# Patient Record
Sex: FEMALE | Race: WHITE | NOT HISPANIC OR LATINO | Employment: FULL TIME | URBAN - METROPOLITAN AREA
[De-identification: names, ages, dates, MRNs, and addresses within clinical notes are randomized per-mention and may not be internally consistent; named-entity substitution may affect disease eponyms.]

---

## 2023-09-14 ENCOUNTER — TELEPHONE (OUTPATIENT)
Dept: PRIMARY CARE | Facility: CLINIC | Age: 38
End: 2023-09-14
Payer: COMMERCIAL

## 2023-09-14 NOTE — TELEPHONE ENCOUNTER
Pt called and states that she still have a lingering hard cough that lasted for 3 weeks. She believed she could had covid at the beginning(3weeks ago) she had a fever, diarrhea and chest congestion but she is doing a lot better from then. She have a hard cough and she sometimes cough up yellow/clear phlegm. She want to get rid of this cough. She want to know if you can prescribe something or can the appt be a virtual visit, please advise.

## 2023-09-17 PROBLEM — D72.819 LEUKOPENIA: Status: ACTIVE | Noted: 2023-09-17

## 2023-09-17 PROBLEM — K21.9 GERD WITHOUT ESOPHAGITIS: Status: ACTIVE | Noted: 2023-09-17

## 2023-09-18 RX ORDER — NORETHINDRONE ACETATE AND ETHINYL ESTRADIOL 1; 20 MG/1; UG/1
1 TABLET ORAL DAILY
COMMUNITY
Start: 2023-09-05

## 2023-09-18 RX ORDER — DROSPIRENONE AND ETHINYL ESTRADIOL 0.02-3(28)
1 KIT ORAL DAILY
COMMUNITY
Start: 2023-08-03 | End: 2023-09-19 | Stop reason: ALTCHOICE

## 2023-09-19 ENCOUNTER — TELEMEDICINE (OUTPATIENT)
Dept: PRIMARY CARE | Facility: CLINIC | Age: 38
End: 2023-09-19
Payer: COMMERCIAL

## 2023-09-19 DIAGNOSIS — J40 BRONCHITIS: ICD-10-CM

## 2023-09-19 DIAGNOSIS — J01.00 ACUTE NON-RECURRENT MAXILLARY SINUSITIS: Primary | ICD-10-CM

## 2023-09-19 PROCEDURE — 99213 OFFICE O/P EST LOW 20 MIN: CPT | Performed by: INTERNAL MEDICINE

## 2023-09-19 RX ORDER — PREDNISONE 10 MG/1
20 TABLET ORAL DAILY
Qty: 14 TABLET | Refills: 0 | Status: SHIPPED | OUTPATIENT
Start: 2023-09-19 | End: 2023-12-05 | Stop reason: WASHOUT

## 2023-09-19 RX ORDER — AZITHROMYCIN 250 MG/1
TABLET, FILM COATED ORAL
Qty: 6 TABLET | Refills: 0 | Status: SHIPPED | OUTPATIENT
Start: 2023-09-19 | End: 2023-09-24

## 2023-09-19 RX ORDER — CODEINE PHOSPHATE AND GUAIFENESIN 10; 100 MG/5ML; MG/5ML
5 SOLUTION ORAL EVERY 6 HOURS PRN
Qty: 240 ML | Refills: 0 | Status: SHIPPED | OUTPATIENT
Start: 2023-09-19 | End: 2023-10-03

## 2023-09-19 ASSESSMENT — ENCOUNTER SYMPTOMS
FEVER: 0
SWEATS: 1
WHEEZING: 0
WEIGHT LOSS: 0
COUGH: 1
RHINORRHEA: 1
HEMOPTYSIS: 0
SHORTNESS OF BREATH: 0
SORE THROAT: 0
HEADACHES: 1
MYALGIAS: 1
CHILLS: 0
HEARTBURN: 0

## 2023-09-19 NOTE — PROGRESS NOTES
PCP: Barby Seaman MD   I have reviewed existing histories, notes, past medical history, surgical history, social history, family history, med list, allergy list, and immunization, and updated.    HPI:   A few weeks ago, started a cough that felt like typical cold cough. Then persisted. Yellow mucus at times, headache and Post nasal drainage and feels like in upper chest also. No sob or wheezing. Feels like there is fever, but no fever when she checks it. Has been on tylenol on and off. Taking dayfile and Nyquil, Mucinex. Used nasal saline irrigation. On Flonase and antihistamine for her allergy year round    She checked her bp, pulse, pulse ox, all normal except for diastolic 90      Lab Results   Component Value Date    WBC 3.8 (L) 11/19/2019    HGB 14.6 11/19/2019    HCT 45.6 11/19/2019     11/19/2019    CHOL 193 11/19/2019    TRIG 70 11/19/2019    HDL 57.4 11/19/2019    ALT 15 11/19/2019    AST 16 11/19/2019     11/19/2019    K 4.0 11/19/2019     11/19/2019    CREATININE 0.58 11/19/2019    BUN 13 11/19/2019    CO2 29 11/19/2019    TSH 1.28 04/24/2021     Physical exam:  There were no vitals taken for this visit.   pt appears well.  No acute distress or resp distress  Patient is alert and orientated three.  mood and affection appropriate      Assessments/orders:   1. Acute non-recurrent maxillary sinusitis  azithromycin (Zithromax) 250 mg tablet, predniSONE (Deltasone) 10 mg tablet, codeine-guaifenesin (Robitussin-AC)  mg/5 mL syrup      2. Bronchitis  azithromycin (Zithromax) 250 mg tablet, predniSONE (Deltasone) 10 mg tablet, codeine-guaifenesin (Robitussin-AC)  mg/5 mL syrup                   Answers submitted by the patient for this visit:  Cough Questionnaire (Submitted on 9/19/2023)  Chief Complaint: Cough  Chronicity: new  Onset: 1 to 4 weeks ago  Progression since onset: waxing and waning  Frequency: hourly  Cough characteristics: productive of sputum  chest pain:  Yes  chills: No  ear congestion: Yes  ear pain: No  fever: No  headaches: Yes  heartburn: No  hemoptysis: No  myalgias: Yes  nasal congestion: Yes  postnasal drip: Yes  rash: No  rhinorrhea: Yes  shortness of breath: No  sore throat: No  sweats: Yes  weight loss: No  wheezing: No  Aggravated by: lying down

## 2023-12-05 ENCOUNTER — TELEMEDICINE (OUTPATIENT)
Dept: PRIMARY CARE | Facility: CLINIC | Age: 38
End: 2023-12-05
Payer: COMMERCIAL

## 2023-12-05 DIAGNOSIS — J01.11 ACUTE RECURRENT FRONTAL SINUSITIS: Primary | ICD-10-CM

## 2023-12-05 PROCEDURE — 99213 OFFICE O/P EST LOW 20 MIN: CPT | Performed by: FAMILY MEDICINE

## 2023-12-05 RX ORDER — AMOXICILLIN AND CLAVULANATE POTASSIUM 875; 125 MG/1; MG/1
875 TABLET, FILM COATED ORAL 2 TIMES DAILY
Qty: 20 TABLET | Refills: 0 | Status: SHIPPED | OUTPATIENT
Start: 2023-12-05 | End: 2023-12-15

## 2023-12-05 ASSESSMENT — ENCOUNTER SYMPTOMS
SINUS PAIN: 1
SINUS PRESSURE: 1
SHORTNESS OF BREATH: 0
COUGH: 1
RHINORRHEA: 1

## 2023-12-05 ASSESSMENT — LIFESTYLE VARIABLES: HISTORY_OF_SMOKING: I HAVE NEVER SMOKED

## 2023-12-05 NOTE — PROGRESS NOTES
Subjective   Patient ID: Rocio Hull is a 38 y.o. female who presents for No chief complaint on file..    Virtual or Telephone Consent    An interactive audio and video telecommunication system which permits real time communications between the patient (at the originating site) and provider (at the distant site) was utilized to provide this telehealth service.   Verbal consent was requested and obtained from Rocio Hull on this date, 12/05/23 for a telehealth visit.     Pt reports 12 day history of fever, nasal congestion  The fever has resolved  She has frontal sinus pressure, she has green nasal discharge  She has a cough, post nasal drainage, no SOB  She takes Zyrtec, Flonase, she added Mucinex  She had a recent sinus infection in September         Review of Systems   HENT:  Positive for congestion, rhinorrhea, sinus pressure and sinus pain.    Respiratory:  Positive for cough. Negative for shortness of breath.        Objective   There were no vitals taken for this visit.    Physical Exam  Constitutional:       Appearance: Normal appearance.   HENT:      Nose: Congestion present.   Pulmonary:      Effort: Pulmonary effort is normal.   Neurological:      Mental Status: She is alert.       Virtual Visit Duration: 10 min  [place]  Meds reviewed  Photo ID: verified      Assessment/Plan   Diagnoses and all orders for this visit:  Acute recurrent frontal sinusitis  -     amoxicillin-pot clavulanate (Augmentin) 875-125 mg tablet; Take 1 tablet (875 mg) by mouth 2 times a day for 10 days.  I counseled patient regarding the risks, benefits and side effects of antibiotics. Recc continued supportive care, fluids, rest, Mucinex  I advised the patient to have an in person exam with PCP, urgent care, or Emergency Room with any exacerbation of symptoms. The patient understands and agrees.     Eliana Chen, DO

## 2024-04-07 ENCOUNTER — PATIENT MESSAGE (OUTPATIENT)
Dept: PRIMARY CARE | Facility: CLINIC | Age: 39
End: 2024-04-07

## 2024-04-07 ENCOUNTER — TELEMEDICINE (OUTPATIENT)
Dept: PRIMARY CARE | Facility: CLINIC | Age: 39
End: 2024-04-07
Payer: COMMERCIAL

## 2024-04-07 DIAGNOSIS — L30.9 DERMATITIS: Primary | ICD-10-CM

## 2024-04-07 PROCEDURE — 99212 OFFICE O/P EST SF 10 MIN: CPT | Performed by: FAMILY MEDICINE

## 2024-04-07 RX ORDER — DESONIDE 0.5 MG/G
OINTMENT TOPICAL 2 TIMES DAILY PRN
Qty: 30 G | Refills: 0 | Status: SHIPPED | OUTPATIENT
Start: 2024-04-07 | End: 2025-04-07

## 2024-04-07 NOTE — PROGRESS NOTES
I performed this visit using realtime telehealth tools, including an audio/video OR telephone connection between the patient listed who was located in the [facility] and myself, Gayle Vines (Board certified in the [facility]).  At the start of the visit, I introduced myself as Dr. Rogers and verified the patients name, , and current physical location.    If they were currently outside of the state of [place], the visit was ended and the patient was referred to alternative means for evaluation and treatment.   The patient was made aware of the limitations of the telehealth visit.  They will not be physically examined and all issues may not be appropriate for a telehealth visit.  If necessary, an in person referral will be made.      All allergies were reviewed as well as reconciliation of all medications.      Rash-- on face--noted in Friday am behind ears and neck--slowly spread   On steroids for bronchitis in September--made her feel crazy-- prednisone--   Has had sensitive skin  Went to derm--told has a large histamine response to things--  Uses face creams and oils in the am and used in a different order than usual  Face and upper neck/chest and back of left hand--  Itches and sensitive --took benadry last night and this am--  Helped with itching a bit  Could tell when benadryl kicked in  Small little bumps--occas redness blotchy under bumps  Sl swollen under eyes and nose--   Have not been outside--  No new exposures to pets  No new laundry soaps  Had a rash from lotion LUSH and had a reaction--  As a teen slight acne and used benzoyl peroxide but left similar rash--   ?old products--   Zyrtec-- takes every day and flonase--  No flu like sxs  +nausea on Friday-- no V/D  Fatigue--+ ? From benadryl  Always has a bit of sneeze and cough-- 2 small kids-- and allergies--    ALL OTHER ROS (-)    General appearance:  Vitals available from patient?No    Alert, oriented, pleasant, in no apparent distress  Yes  Answers questions appropriatelyYes  Eyes clear?Yes    Throat exam Not Available    Is patient in respiratory distressNo  Is patient coughing during consult:No  Audible wheezing noted? :No    Pt sounds congested?: No  Sniffing or rhinorrhea?: No    Tender neck LAD by pt exam?:    Psychiatric: Affect normalYes  Pics reviewed--  Papular rash noted-- no pustules no crusting no excoriations- no vesicles    Other relevant physical exam:      Pt location in [place] and consent obtained. Telemedicine appropriate evaluation completed. Appropriate physical exam done.    Dermatitis--unknown etiology  Trial of desonide ointment  TOPICAL STEROIDS:  Use sparingly ONLY on affected skin in a THIN LAYER for the shortest duration of time needed.  Can cause lightening of the skin, spider veins, thinning of the skin, and/or stretch marks.  Antihistamines as needed for itching

## 2024-04-09 RX ORDER — METHYLPREDNISOLONE 4 MG/1
TABLET ORAL
Qty: 21 TABLET | Refills: 0 | Status: SHIPPED | OUTPATIENT
Start: 2024-04-09 | End: 2024-04-16

## 2024-04-12 NOTE — PATIENT INSTRUCTIONS
Dermatitis--unknown etiology  Trial of desonide ointment  TOPICAL STEROIDS:  Use sparingly ONLY on affected skin in a THIN LAYER for the shortest duration of time needed.  Can cause lightening of the skin, spider veins, thinning of the skin, and/or stretch marks.  Antihistamines as needed for itching    Please send me a D and K interprisest message if you have any questions or concerns.  FOR NON URGENT questions only.  Allow up to 72 hours for response.    If you have prescription issues or other questions you can email   Duane [facility] Health Coordinator, at   [email or web address]     Rest and drink plenty of fluids    Tylenol and or motrin as needed for pain and fever (unless you have been told not to take these because of your personal medical history)    Discussed options and precautions (complaint specific and may include)  Viral versus bacterial infection; use of medications; possible side effects; appropriate over-the-counter medications; possible complications and /or when to follow-up.    Follow-up in 1 to 2 days if not improving.  Follow-up immediately if symptoms worsen.    All red flags requiring in person care were discussed.  All patient's questions were answered.    To connect with a new PCP please visit [email or web address] or call [phone]     If experiencing any severe or worsening symptoms including but not limited to lethargy / chest pain / weakness / dizziness / difficulty breathing please call 911 or go to the emergency department for immediate care!    Limitations to telemedicine include inability to do a complete and accurate physical exam.  Any concerns regarding this were conveyed with the patient and in person follow-up recommended if patient nature of illness does not progress as anticipated during this visit.    CDC updated Respiratory Infection guidelines:   When you have a respiratory virus, stay home and away from others (including  people  you live with who are not sick) Symptoms can include fever, chills, fatigue, cough,  runny nose, and headache (and others).    You can go back to your normal activities when,   for at least 24 hours,   BOTH are true:    1) Your symptoms are getting better overall  2) You have not had a fever (and are not using fever-reducing medication).    When you go back to your normal activities, take added precaution over the next 5 days, such as taking additional steps for  air, hygiene, masks, physical distancing, and/or testing when you will be around other people indoors.    Keep in mind that you may still be able to spread the virus that made you sick, even if you are feeling better. You are likely to be less contagious at this time, depending on factors like how long you were sick or how sick you were.    If you develop a fever or you start to feel worse after you have gone back to normal activities, stay home and away from others again until, for at least 24 hours, both are true: your symptoms are improving overall, and you have not had a fever (and are not using fever-reducing medication). Then take added precaution for the next 5 days.    If you never had symptoms but tested positive for a respiratory virus?, you may be contagious. For the next 5 days: take added precaution, such as taking additional steps for  air, hygiene, masks, physical distancing, and/or testing when you will be around other people indoors. This is especially important to protect people with factors that increase their risk of severe illness from respiratory viruses.  Avoid immunocompromised, elderly, pregnant women, infants etc    Have a low threshold for in person evaluation if your symptoms worsen.

## 2024-04-17 RX ORDER — HYDROXYZINE HYDROCHLORIDE 25 MG/1
25 TABLET, FILM COATED ORAL EVERY 6 HOURS PRN
Qty: 28 TABLET | Refills: 0 | Status: SHIPPED | OUTPATIENT
Start: 2024-04-17 | End: 2024-04-24

## 2025-02-27 ENCOUNTER — APPOINTMENT (OUTPATIENT)
Dept: PRIMARY CARE | Facility: CLINIC | Age: 40
End: 2025-02-27
Payer: COMMERCIAL

## 2025-02-27 VITALS
OXYGEN SATURATION: 97 % | WEIGHT: 213 LBS | HEIGHT: 69 IN | DIASTOLIC BLOOD PRESSURE: 87 MMHG | HEART RATE: 84 BPM | TEMPERATURE: 97.9 F | BODY MASS INDEX: 31.55 KG/M2 | SYSTOLIC BLOOD PRESSURE: 140 MMHG

## 2025-02-27 DIAGNOSIS — Z00.00 PHYSICAL EXAM: Primary | ICD-10-CM

## 2025-02-27 DIAGNOSIS — E55.9 VITAMIN D DEFICIENCY: ICD-10-CM

## 2025-02-27 DIAGNOSIS — H04.123 DRY EYES: ICD-10-CM

## 2025-02-27 DIAGNOSIS — R21 BUTTERFLY RASH: ICD-10-CM

## 2025-02-27 PROBLEM — J01.00 ACUTE MAXILLARY SINUSITIS: Status: ACTIVE | Noted: 2025-02-27

## 2025-02-27 PROBLEM — R13.10 DYSPHAGIA: Status: ACTIVE | Noted: 2025-02-27

## 2025-02-27 PROBLEM — R11.2 NAUSEA AND VOMITING: Status: ACTIVE | Noted: 2025-02-27

## 2025-02-27 PROBLEM — K59.09 CHRONIC CONSTIPATION: Status: ACTIVE | Noted: 2025-02-27

## 2025-02-27 PROBLEM — J40 BRONCHITIS: Status: ACTIVE | Noted: 2025-02-27

## 2025-02-27 PROBLEM — R50.9 FEVER: Status: ACTIVE | Noted: 2025-02-27

## 2025-02-27 PROBLEM — R14.3 PASSING FLATUS: Status: ACTIVE | Noted: 2025-02-27

## 2025-02-27 PROBLEM — R87.610 ASCUS OF CERVIX WITH NEGATIVE HIGH RISK HPV: Status: ACTIVE | Noted: 2025-02-27

## 2025-02-27 PROBLEM — R53.83 FATIGUE: Status: ACTIVE | Noted: 2025-02-27

## 2025-02-27 PROBLEM — R19.7 DIARRHEA: Status: ACTIVE | Noted: 2025-02-27

## 2025-02-27 PROCEDURE — 3008F BODY MASS INDEX DOCD: CPT | Performed by: INTERNAL MEDICINE

## 2025-02-27 PROCEDURE — 1036F TOBACCO NON-USER: CPT | Performed by: INTERNAL MEDICINE

## 2025-02-27 PROCEDURE — 99396 PREV VISIT EST AGE 40-64: CPT | Performed by: INTERNAL MEDICINE

## 2025-02-27 RX ORDER — NORETHINDRONE ACETATE AND ETHINYL ESTRADIOL AND FERROUS FUMARATE 1MG-20(21)
1 KIT ORAL DAILY
COMMUNITY

## 2025-02-27 RX ORDER — CETIRIZINE HYDROCHLORIDE 10 MG/1
10 TABLET ORAL DAILY
COMMUNITY

## 2025-02-27 ASSESSMENT — PATIENT HEALTH QUESTIONNAIRE - PHQ9
1. LITTLE INTEREST OR PLEASURE IN DOING THINGS: NOT AT ALL
2. FEELING DOWN, DEPRESSED OR HOPELESS: NOT AT ALL
9. THOUGHTS THAT YOU WOULD BE BETTER OFF DEAD, OR OF HURTING YOURSELF: NOT AT ALL
8. MOVING OR SPEAKING SO SLOWLY THAT OTHER PEOPLE COULD HAVE NOTICED. OR THE OPPOSITE, BEING SO FIGETY OR RESTLESS THAT YOU HAVE BEEN MOVING AROUND A LOT MORE THAN USUAL: NOT AT ALL
5. POOR APPETITE OR OVEREATING: NOT AT ALL
SUM OF ALL RESPONSES TO PHQ9 QUESTIONS 1 AND 2: 0
SUM OF ALL RESPONSES TO PHQ QUESTIONS 1-9: 2
7. TROUBLE CONCENTRATING ON THINGS, SUCH AS READING THE NEWSPAPER OR WATCHING TELEVISION: NOT AT ALL
3. TROUBLE FALLING OR STAYING ASLEEP OR SLEEPING TOO MUCH: SEVERAL DAYS
6. FEELING BAD ABOUT YOURSELF - OR THAT YOU ARE A FAILURE OR HAVE LET YOURSELF OR YOUR FAMILY DOWN: NOT AT ALL
4. FEELING TIRED OR HAVING LITTLE ENERGY: SEVERAL DAYS

## 2025-02-27 ASSESSMENT — ANXIETY QUESTIONNAIRES
1. FEELING NERVOUS, ANXIOUS, OR ON EDGE: NOT AT ALL
2. NOT BEING ABLE TO STOP OR CONTROL WORRYING: NOT AT ALL
GAD7 TOTAL SCORE: 1
6. BECOMING EASILY ANNOYED OR IRRITABLE: NOT AT ALL
IF YOU CHECKED OFF ANY PROBLEMS ON THIS QUESTIONNAIRE, HOW DIFFICULT HAVE THESE PROBLEMS MADE IT FOR YOU TO DO YOUR WORK, TAKE CARE OF THINGS AT HOME, OR GET ALONG WITH OTHER PEOPLE: NOT DIFFICULT AT ALL
3. WORRYING TOO MUCH ABOUT DIFFERENT THINGS: NOT AT ALL
5. BEING SO RESTLESS THAT IT IS HARD TO SIT STILL: NOT AT ALL
7. FEELING AFRAID AS IF SOMETHING AWFUL MIGHT HAPPEN: NOT AT ALL
4. TROUBLE RELAXING: SEVERAL DAYS

## 2025-02-27 NOTE — PROGRESS NOTES
"Barby Seaman MD  SUBJECTIVE:     Rocio Hull is a 39 y.o. female presenting for her annual physical/wellness.  Also has had skin issues, would like to rule out CTD. Facial butterfly rash noted on several occasions, also in general skin is sensitive to various things. right sided neck tenderness at times, no other joint pain or swelling or stiffness.  Admits to having felt dry eyes, also somewhat dry mouth, feels need to drink lot of water.  Has IBS, not bad.  Had some rash to new bcp, and got better as she continued to take.  When she was off bcp, periods were erectic.      Colon screening: mom had colon polyps.   Last pap: Up to date   Last mammogram: has order  Dexa na  Vaccines mostly Up to date   Diet:   healthy in general  Exercises:  regularly    No results found for: \"HGBA1C\"  Lab Results   Component Value Date    CREATININE 0.58 11/19/2019     Lab Results   Component Value Date    WBC 3.8 (L) 11/19/2019    HGB 14.6 11/19/2019    HCT 45.6 11/19/2019    MCV 94 11/19/2019     11/19/2019     Lab Results   Component Value Date    CHOL 193 11/19/2019     Lab Results   Component Value Date    HDL 57.4 11/19/2019     No results found for: \"LDLCALC\"  Lab Results   Component Value Date    TRIG 70 11/19/2019         ROS:   otherwise Feeling well. No dyspnea or chest pain on exertion. No abdominal pain, change in bowel habits, black or bloody stools. No urinary tract or  symptoms.  No breast concerns. No neurological complaints.    OBJECTIVE:   The patient appears well, alert, oriented x 3, in no distress.   /87   Pulse 84   Temp 36.6 °C (97.9 °F)   Ht 1.753 m (5' 9\")   Wt 96.6 kg (213 lb)   SpO2 97%   BMI 31.45 kg/m²   ENT normal.  Neck supple. No adenopathy or thyromegaly. KIP. Lungs are clear, good air entry, no wheezes, rhonchi or rales. S1 and S2 normal, no murmurs, regular rate and rhythm. Abdomen is soft without tenderness, guarding, mass or organomegaly.  exam deferred to Gyn. " Extremities show no edema, normal peripheral pulses. Neurological is normal without focal findings.    Assessment & Plan  Physical exam    Orders:    Comprehensive Metabolic Panel; Future    Hemoglobin A1C; Future    Lipid Panel; Future    CBC and Auto Differential; Future    Butterfly rash    Orders:    TSH with reflex to Free T4 if abnormal; Future    MINGO with Reflex to WU; Future    Anti-SSA; Future    Anti-SSB; Future    Sedimentation Rate; Future    Urinalysis with Reflex Microscopic; Future    C-Reactive Protein; Future    Rheumatoid Factor; Future    Citrulline Antibody, IgG; Future    Dry eyes    Orders:    TSH with reflex to Free T4 if abnormal; Future    MINGO with Reflex to WU; Future    Anti-SSA; Future    Anti-SSB; Future    Sedimentation Rate; Future    Urinalysis with Reflex Microscopic; Future    C-Reactive Protein; Future    Rheumatoid Factor; Future    Citrulline Antibody, IgG; Future    Vitamin D deficiency    Orders:    Vitamin D 25-Hydroxy,Total (for eval of Vitamin D levels); Future

## 2025-03-06 LAB
25(OH)D3+25(OH)D2 SERPL-MCNC: 21 NG/ML (ref 30–100)
ALBUMIN SERPL-MCNC: 4.3 G/DL (ref 3.6–5.1)
ALP SERPL-CCNC: 42 U/L (ref 31–125)
ALT SERPL-CCNC: 19 U/L (ref 6–29)
ANA SER QL IF: NEGATIVE
ANION GAP SERPL CALCULATED.4IONS-SCNC: 10 MMOL/L (CALC) (ref 7–17)
AST SERPL-CCNC: 14 U/L (ref 10–30)
BASOPHILS # BLD AUTO: 47 CELLS/UL (ref 0–200)
BASOPHILS NFR BLD AUTO: 0.6 %
BILIRUB SERPL-MCNC: 0.4 MG/DL (ref 0.2–1.2)
BUN SERPL-MCNC: 13 MG/DL (ref 7–25)
CALCIUM SERPL-MCNC: 9.2 MG/DL (ref 8.6–10.2)
CCP IGG SERPL-ACNC: <16 UNITS
CHLORIDE SERPL-SCNC: 102 MMOL/L (ref 98–110)
CHOLEST SERPL-MCNC: 192 MG/DL
CHOLEST/HDLC SERPL: 3.6 (CALC)
CO2 SERPL-SCNC: 25 MMOL/L (ref 20–32)
CREAT SERPL-MCNC: 0.74 MG/DL (ref 0.5–0.99)
CRP SERPL-MCNC: <3 MG/L
EGFRCR SERPLBLD CKD-EPI 2021: 105 ML/MIN/1.73M2
ENA SS-A AB SER IA-ACNC: NORMAL AI
ENA SS-B AB SER IA-ACNC: NORMAL AI
EOSINOPHIL # BLD AUTO: 101 CELLS/UL (ref 15–500)
EOSINOPHIL NFR BLD AUTO: 1.3 %
ERYTHROCYTE [DISTWIDTH] IN BLOOD BY AUTOMATED COUNT: 12.2 % (ref 11–15)
ERYTHROCYTE [SEDIMENTATION RATE] IN BLOOD BY WESTERGREN METHOD: 2 MM/H
EST. AVERAGE GLUCOSE BLD GHB EST-MCNC: 100 MG/DL
EST. AVERAGE GLUCOSE BLD GHB EST-SCNC: 5.5 MMOL/L
GLUCOSE SERPL-MCNC: 83 MG/DL (ref 65–99)
HBA1C MFR BLD: 5.1 % OF TOTAL HGB
HCT VFR BLD AUTO: 42.7 % (ref 35–45)
HDLC SERPL-MCNC: 53 MG/DL
HGB BLD-MCNC: 14.2 G/DL (ref 11.7–15.5)
LDLC SERPL CALC-MCNC: 119 MG/DL (CALC)
LYMPHOCYTES # BLD AUTO: 1950 CELLS/UL (ref 850–3900)
LYMPHOCYTES NFR BLD AUTO: 25 %
MCH RBC QN AUTO: 30.3 PG (ref 27–33)
MCHC RBC AUTO-ENTMCNC: 33.3 G/DL (ref 32–36)
MCV RBC AUTO: 91.2 FL (ref 80–100)
MONOCYTES # BLD AUTO: 491 CELLS/UL (ref 200–950)
MONOCYTES NFR BLD AUTO: 6.3 %
NEUTROPHILS # BLD AUTO: 5210 CELLS/UL (ref 1500–7800)
NEUTROPHILS NFR BLD AUTO: 66.8 %
NONHDLC SERPL-MCNC: 139 MG/DL (CALC)
PLATELET # BLD AUTO: 310 THOUSAND/UL (ref 140–400)
PMV BLD REES-ECKER: 9.5 FL (ref 7.5–12.5)
POTASSIUM SERPL-SCNC: 4.3 MMOL/L (ref 3.5–5.3)
PROT SERPL-MCNC: 6.8 G/DL (ref 6.1–8.1)
RBC # BLD AUTO: 4.68 MILLION/UL (ref 3.8–5.1)
RHEUMATOID FACT SERPL-ACNC: <10 IU/ML
SODIUM SERPL-SCNC: 137 MMOL/L (ref 135–146)
TRIGL SERPL-MCNC: 97 MG/DL
TSH SERPL-ACNC: 1.39 MIU/L
WBC # BLD AUTO: 7.8 THOUSAND/UL (ref 3.8–10.8)

## 2025-03-19 ENCOUNTER — PATIENT MESSAGE (OUTPATIENT)
Dept: PRIMARY CARE | Facility: CLINIC | Age: 40
End: 2025-03-19
Payer: COMMERCIAL

## 2025-03-19 DIAGNOSIS — R23.2 FACIAL FLUSHING: ICD-10-CM

## 2025-03-19 DIAGNOSIS — I10 PRIMARY HYPERTENSION: Primary | ICD-10-CM

## 2025-08-06 DIAGNOSIS — Z12.31 ENCOUNTER FOR SCREENING MAMMOGRAM FOR BREAST CANCER: ICD-10-CM

## 2026-03-03 ENCOUNTER — APPOINTMENT (OUTPATIENT)
Dept: PRIMARY CARE | Facility: CLINIC | Age: 41
End: 2026-03-03
Payer: COMMERCIAL